# Patient Record
Sex: MALE | Race: WHITE | NOT HISPANIC OR LATINO | Employment: FULL TIME | URBAN - METROPOLITAN AREA
[De-identification: names, ages, dates, MRNs, and addresses within clinical notes are randomized per-mention and may not be internally consistent; named-entity substitution may affect disease eponyms.]

---

## 2020-02-01 ENCOUNTER — OFFICE VISIT (OUTPATIENT)
Dept: URGENT CARE | Facility: CLINIC | Age: 65
End: 2020-02-01
Payer: COMMERCIAL

## 2020-02-01 VITALS
SYSTOLIC BLOOD PRESSURE: 107 MMHG | RESPIRATION RATE: 16 BRPM | DIASTOLIC BLOOD PRESSURE: 51 MMHG | WEIGHT: 265 LBS | OXYGEN SATURATION: 98 % | HEIGHT: 68 IN | TEMPERATURE: 98.3 F | BODY MASS INDEX: 40.16 KG/M2 | HEART RATE: 74 BPM

## 2020-02-01 DIAGNOSIS — J06.9 VIRAL URI WITH COUGH: Primary | ICD-10-CM

## 2020-02-01 DIAGNOSIS — H69.93 DISORDER OF BOTH EUSTACHIAN TUBES: ICD-10-CM

## 2020-02-01 PROCEDURE — 99203 OFFICE O/P NEW LOW 30 MIN: CPT | Performed by: EMERGENCY MEDICINE

## 2020-02-01 RX ORDER — LISINOPRIL 10 MG/1
10 TABLET ORAL DAILY
COMMUNITY

## 2020-02-01 RX ORDER — PREDNISONE 10 MG/1
TABLET ORAL
Qty: 27 TABLET | Refills: 0 | Status: SHIPPED | OUTPATIENT
Start: 2020-02-01

## 2020-02-01 NOTE — PATIENT INSTRUCTIONS
You have been diagnosed with a Viral Upper Respiratory infection and your symptoms should resolve over the next 7 to 10 days with the treatments recommended today  If they do not, it is possible that you have developed a bacterial infection and you should return  If you were to take an antibiotic while you are still in the viral stage, you will not get better any faster, but could kill off the good germs in your body as well as make the germs in you resistant to the antibiotic  Take an expectorant - guaifenesin should be the only ingredient - during the day, and the cough suppressant (ex  Robitussin DM or Tessalon) if needed at night only  Take Zinc 12 5 to 15 mg every 2 - 3 hrs while awake for the next few days  You may take Cold Garry (13 3 mg of Zinc) or split a 25 mg Zinc tablet or lozenge in two or a 50 mg into four to get the proper dose  The total daily dose of Zinc should exceed 75 mg per day  You may also take a decongestant like Sudafed, unless you have hypertension or cardiac disease  Hold any NSAIDs like Ibuprofen (Advil), Naprosyn (Aleve), etc while on steroids like Medrol or Prednisone  If you are diabetic, you should also adhere strictly to your diet and monitor your blood sugar closely while on the steroids as discussed  Upper Respiratory Infection   AMBULATORY CARE:   An upper respiratory infection  is also called a common cold  It can affect your nose, throat, ears, and sinuses  Common signs and symptoms include the following:  Cold symptoms are usually worst for the first 3 to 5 days  You may have any of the following:  · Runny or stuffy nose    · Sneezing and coughing    · Sore throat or hoarseness    · Red, watery, and sore eyes    · Fatigue     · Chills and fever    · Headache, body aches, or sore muscles  Seek care immediately if:   · You have chest pain or trouble breathing  Contact your healthcare provider if:   · You have a fever over 102ºF (39°C)      · Your sore throat gets worse or you see white or yellow spots in your throat  · Your symptoms get worse after 3 to 5 days or your cold is not better in 14 days  · You have a rash anywhere on your skin  · You have large, tender lumps in your neck  · You have thick, green or yellow drainage from your nose  · You cough up thick yellow, green, or bloody mucus  · You have vomiting for more than 24 hours and cannot keep fluids down  · You have a bad earache  · You have questions or concerns about your condition or care  Treatment for a cold: There is no cure for the common cold  Colds are caused by viruses and do not get better with antibiotics  Most people get better in 7 to 14 days  You may continue to cough for 2 to 3 weeks  The following may help decrease your symptoms:  · Decongestants  help reduce nasal congestion and help you breathe more easily  If you take decongestant pills, they may make you feel restless or not able to sleep  Do not use decongestant sprays for more than a few days  · Cough suppressants  help reduce coughing  Ask your healthcare provider which type of cough medicine is best for you  · NSAIDs , such as ibuprofen, help decrease swelling, pain, and fever  NSAIDs can cause stomach bleeding or kidney problems in certain people  If you take blood thinner medicine, always ask your healthcare provider if NSAIDs are safe for you  Always read the medicine label and follow directions  · Acetaminophen  decreases pain and fever  It is available without a doctor's order  Ask how much to take and how often to take it  Follow directions  Read the labels of all other medicines you are using to see if they also contain acetaminophen, or ask your doctor or pharmacist  Acetaminophen can cause liver damage if not taken correctly  Do not use more than 4 grams (4,000 milligrams) total of acetaminophen in one day  Manage your cold:   · Rest as much as possible  Slowly start to do more each day  · Drink more liquids as directed  Liquids will help thin and loosen mucus so you can cough it up  Liquids will also help prevent dehydration  Liquids that help prevent dehydration include water, fruit juice, and broth  Do not drink liquids that contain caffeine  Caffeine can increase your risk for dehydration  Ask your healthcare provider how much liquid to drink each day  · Soothe a sore throat  Gargle with warm salt water  This helps your sore throat feel better  Make salt water by dissolving ¼ teaspoon salt in 1 cup warm water  You may also suck on hard candy or throat lozenges  You may use a sore throat spray  · Use a humidifier or vaporizer  Use a cool mist humidifier or a vaporizer to increase air moisture in your home  This may make it easier for you to breathe and help decrease your cough  · Use saline nasal drops as directed  These help relieve congestion  · Apply petroleum-based jelly around the outside of your nostrils  This can decrease irritation from blowing your nose  · Do not smoke  Nicotine and other chemicals in cigarettes and cigars can make your symptoms worse  They can also cause infections such as bronchitis or pneumonia  Ask your healthcare provider for information if you currently smoke and need help to quit  E-cigarettes or smokeless tobacco still contain nicotine  Talk to your healthcare provider before you use these products  Prevent spreading your cold to others:   · Try to stay away from other people during the first 2 to 3 days of your cold when it is more easily spread  · Do not share food or drinks  · Do not share hand towels with household members  · Wash your hands often, especially after you blow your nose  Turn away from other people and cover your mouth and nose with a tissue when you sneeze or cough  Follow up with your healthcare provider as directed:  Write down your questions so you remember to ask them during your visits     © 2017 [facility] Information is for End User's use only and may not be sold, redistributed or otherwise used for commercial purposes  All illustrations and images included in CareNotes® are the copyrighted property of [employer] M [employer]  or [employer]  The above information is an  only  It is not intended as medical advice for individual conditions or treatments  Talk to your doctor, nurse or pharmacist before following any medical regimen to see if it is safe and effective for you  Eustachian Tube Dysfunction   WHAT YOU NEED TO KNOW:   What is eustachian tube dysfunction? Eustachian tube dysfunction (ETD) is a condition that prevents your eustachian tubes from opening properly  It can also cause them to become blocked  Eustachian tubes connect your middle ear to the back of your nose and throat  These tubes open and allow air to flow in and out when you sneeze, swallow, or yawn  What causes or increases my risk of ETD? ETD may be caused by swelling or buildup of mucus in your eustachian tubes  Allergies, a cold, or sinus infection can increase your risk for ETD  Smoking also increases your risk for ETD  What are the signs and symptoms of ETD? · Fullness or pressure in your ears    · Muffled hearing    · Pain in one or both ears    · Ringing in your ears    · Popping or clicking feeling in your ears    · Trouble keeping your balance  How is ETD diagnosed? Your healthcare provider will ask about your symptoms  He will examine your ears, your nose, and the back of your throat  He may also do a hearing test    How is ETD treated? Your ETD may get better on its own without any treatment  You may need any of the following:  · Exercises  such as swallowing, yawning, or chewing gum may help to open your eustachian tubes  Your healthcare provider may also recommend that you take a deep breath and then blow with your mouth shut and your nostrils pinched closed       · Air pressure devices  push air into your nose and eustachian tubes to help relieve air pressure in your ear  · Treatment for allergies  such as decongestants, antihistamines, and nasal steroids may improve ETD  They may help decrease swelling of the eustachian tubes  · Ear tubes  may help to keep your middle ear open  During this procedure, your healthcare provider will cut a small hole in your eardrum  · A myringotomy  is procedure to make a small cut in your eardrum and suction out fluid from your middle ear  · Tuboplasty  is a procedure to widen your eustachian tubes  When should I contact my healthcare provider? · Your symptoms do not improve or get worse  · You have a fever  · You have any hearing loss  · You have questions or concerns about your condition or care  CARE AGREEMENT:   You have the right to help plan your care  Learn about your health condition and how it may be treated  Discuss treatment options with your caregivers to decide what care you want to receive  You always have the right to refuse treatment  The above information is an  only  It is not intended as medical advice for individual conditions or treatments  Talk to your doctor, nurse or pharmacist before following any medical regimen to see if it is safe and effective for you  © 2017 [employer] [employer]  Information is for End User's use only and may not be sold, redistributed or otherwise used for commercial purposes  All illustrations and images included in CareNotes® are the copyrighted property of A D A M [employer] Inc  or [employer] Ashby

## 2020-02-01 NOTE — PROGRESS NOTES
[facility]        NAME: Bebo Abdul is a 62 y o  male  : 1955    MRN: [id number]  DATE: 2020  TIME: 12:11 PM    Assessment and Plan   Viral URI with cough [J06 9, B97 89]  1  Viral URI with cough  predniSONE 10 mg tablet   2  Disorder of both eustachian tubes  predniSONE 10 mg tablet         Patient Instructions     Patient Instructions     You have been diagnosed with a Viral Upper Respiratory infection and your symptoms should resolve over the next 7 to 10 days with the treatments recommended today  If they do not, it is possible that you have developed a bacterial infection and you should return  If you were to take an antibiotic while you are still in the viral stage, you will not get better any faster, but could kill off the good germs in your body as well as make the germs in you resistant to the antibiotic  Take an expectorant - guaifenesin should be the only ingredient - during the day, and the cough suppressant (ex  Robitussin DM or Tessalon) if needed at night only  Take Zinc 12 5 to 15 mg every 2 - 3 hrs while awake for the next few days  You may take Cold Garry (13 3 mg of Zinc) or split a 25 mg Zinc tablet or lozenge in two or a 50 mg into four to get the proper dose  The total daily dose of Zinc should exceed 75 mg per day  You may also take a decongestant like Sudafed, unless you have hypertension or cardiac disease  Hold any NSAIDs like Ibuprofen (Advil), Naprosyn (Aleve), etc while on steroids like Medrol or Prednisone  If you are diabetic, you should also adhere strictly to your diet and monitor your blood sugar closely while on the steroids as discussed  Upper Respiratory Infection   AMBULATORY CARE:   An upper respiratory infection  is also called a common cold  It can affect your nose, throat, ears, and sinuses  Common signs and symptoms include the following:  Cold symptoms are usually worst for the first 3 to 5 days   You may have any of the following:  · Runny or stuffy nose    · Sneezing and coughing    · Sore throat or hoarseness    · Red, watery, and sore eyes    · Fatigue     · Chills and fever    · Headache, body aches, or sore muscles  Seek care immediately if:   · You have chest pain or trouble breathing  Contact your healthcare provider if:   · You have a fever over 102ºF (39°C)  · Your sore throat gets worse or you see white or yellow spots in your throat  · Your symptoms get worse after 3 to 5 days or your cold is not better in 14 days  · You have a rash anywhere on your skin  · You have large, tender lumps in your neck  · You have thick, green or yellow drainage from your nose  · You cough up thick yellow, green, or bloody mucus  · You have vomiting for more than 24 hours and cannot keep fluids down  · You have a bad earache  · You have questions or concerns about your condition or care  Treatment for a cold: There is no cure for the common cold  Colds are caused by viruses and do not get better with antibiotics  Most people get better in 7 to 14 days  You may continue to cough for 2 to 3 weeks  The following may help decrease your symptoms:  · Decongestants  help reduce nasal congestion and help you breathe more easily  If you take decongestant pills, they may make you feel restless or not able to sleep  Do not use decongestant sprays for more than a few days  · Cough suppressants  help reduce coughing  Ask your healthcare provider which type of cough medicine is best for you  · NSAIDs , such as ibuprofen, help decrease swelling, pain, and fever  NSAIDs can cause stomach bleeding or kidney problems in certain people  If you take blood thinner medicine, always ask your healthcare provider if NSAIDs are safe for you  Always read the medicine label and follow directions  · Acetaminophen  decreases pain and fever  It is available without a doctor's order  Ask how much to take and how often to take it  Follow directions  Read the labels of all other medicines you are using to see if they also contain acetaminophen, or ask your doctor or pharmacist  Acetaminophen can cause liver damage if not taken correctly  Do not use more than 4 grams (4,000 milligrams) total of acetaminophen in one day  Manage your cold:   · Rest as much as possible  Slowly start to do more each day  · Drink more liquids as directed  Liquids will help thin and loosen mucus so you can cough it up  Liquids will also help prevent dehydration  Liquids that help prevent dehydration include water, fruit juice, and broth  Do not drink liquids that contain caffeine  Caffeine can increase your risk for dehydration  Ask your healthcare provider how much liquid to drink each day  · Soothe a sore throat  Gargle with warm salt water  This helps your sore throat feel better  Make salt water by dissolving ¼ teaspoon salt in 1 cup warm water  You may also suck on hard candy or throat lozenges  You may use a sore throat spray  · Use a humidifier or vaporizer  Use a cool mist humidifier or a vaporizer to increase air moisture in your home  This may make it easier for you to breathe and help decrease your cough  · Use saline nasal drops as directed  These help relieve congestion  · Apply petroleum-based jelly around the outside of your nostrils  This can decrease irritation from blowing your nose  · Do not smoke  Nicotine and other chemicals in cigarettes and cigars can make your symptoms worse  They can also cause infections such as bronchitis or pneumonia  Ask your healthcare provider for information if you currently smoke and need help to quit  E-cigarettes or smokeless tobacco still contain nicotine  Talk to your healthcare provider before you use these products  Prevent spreading your cold to others:   · Try to stay away from other people during the first 2 to 3 days of your cold when it is more easily spread       · Do not share food or drinks  · Do not share hand towels with household members  · Wash your hands often, especially after you blow your nose  Turn away from other people and cover your mouth and nose with a tissue when you sneeze or cough  Follow up with your healthcare provider as directed:  Write down your questions so you remember to ask them during your visits  © 2017 [address] [employer] Information is for End User's use only and may not be sold, redistributed or otherwise used for commercial purposes  All illustrations and images included in CareNotes® are the copyrighted property of [employer]  or [employer]  The above information is an  only  It is not intended as medical advice for individual conditions or treatments  Talk to your doctor, nurse or pharmacist before following any medical regimen to see if it is safe and effective for you  Eustachian Tube Dysfunction   WHAT YOU NEED TO KNOW:   What is eustachian tube dysfunction? Eustachian tube dysfunction (ETD) is a condition that prevents your eustachian tubes from opening properly  It can also cause them to become blocked  Eustachian tubes connect your middle ear to the back of your nose and throat  These tubes open and allow air to flow in and out when you sneeze, swallow, or yawn  What causes or increases my risk of ETD? ETD may be caused by swelling or buildup of mucus in your eustachian tubes  Allergies, a cold, or sinus infection can increase your risk for ETD  Smoking also increases your risk for ETD  What are the signs and symptoms of ETD? · Fullness or pressure in your ears    · Muffled hearing    · Pain in one or both ears    · Ringing in your ears    · Popping or clicking feeling in your ears    · Trouble keeping your balance  How is ETD diagnosed? Your healthcare provider will ask about your symptoms  He will examine your ears, your nose, and the back of your throat   He may also do a hearing test    How is ETD treated? Your ETD may get better on its own without any treatment  You may need any of the following:  · Exercises  such as swallowing, yawning, or chewing gum may help to open your eustachian tubes  Your healthcare provider may also recommend that you take a deep breath and then blow with your mouth shut and your nostrils pinched closed  · Air pressure devices  push air into your nose and eustachian tubes to help relieve air pressure in your ear  · Treatment for allergies  such as decongestants, antihistamines, and nasal steroids may improve ETD  They may help decrease swelling of the eustachian tubes  · Ear tubes  may help to keep your middle ear open  During this procedure, your healthcare provider will cut a small hole in your eardrum  · A myringotomy  is procedure to make a small cut in your eardrum and suction out fluid from your middle ear  · Tuboplasty  is a procedure to widen your eustachian tubes  When should I contact my healthcare provider? · Your symptoms do not improve or get worse  · You have a fever  · You have any hearing loss  · You have questions or concerns about your condition or care  CARE AGREEMENT:   You have the right to help plan your care  Learn about your health condition and how it may be treated  Discuss treatment options with your caregivers to decide what care you want to receive  You always have the right to refuse treatment  The above information is an  only  It is not intended as medical advice for individual conditions or treatments  Talk to your doctor, nurse or pharmacist before following any medical regimen to see if it is safe and effective for you  © 2017 [facility] Information is for End User's use only and may not be sold, redistributed or otherwise used for commercial purposes   All illustrations and images included in CareNotes® are the copyrighted property of [employer]  or [employer] Ashby  Follow up with PCP in 3-5 days  Proceed to  ER if symptoms worsen  Chief Complaint     Chief Complaint   Patient presents with    Cold Like Symptoms     c/o head and chest congestion approx 4 days, felt chills and sweating yesterday and generalized tiredness         History of Present Illness       Patient complains of cough and congestion for past 4 days associated with bilateral ear fullness and fatigue  Review of Systems   Review of Systems   Constitutional: Negative for chills and fever  HENT: Positive for congestion, ear pain, rhinorrhea and sinus pressure  Negative for ear discharge and hearing loss  Respiratory: Positive for cough  Negative for chest tightness, shortness of breath and wheezing  Gastrointestinal: Negative for diarrhea and vomiting  Musculoskeletal: Negative for neck stiffness  Skin: Negative for pallor  Neurological: Negative for headaches  Current Medications       Current Outpatient Medications:     lisinopril (ZESTRIL) 10 mg tablet, Take 10 mg by mouth daily, Disp: , Rfl:     predniSONE 10 mg tablet, Take once daily all days pills on this schedule 6- 6- 5- 4- 3- 2- 1, Disp: 27 tablet, Rfl: 0    Current Allergies     Allergies as of 02/01/2020 - Reviewed 02/01/2020   Allergen Reaction Noted    Penicillins Hives 02/01/2020            The following portions of the patient's history were reviewed and updated as appropriate: allergies, current medications, past family history, past medical history, past social history, past surgical history and problem list      Past Medical History:   Diagnosis Date    Hypertension        Past Surgical History:   Procedure Laterality Date    NO PAST SURGERIES         History reviewed  No pertinent family history  Medications have been verified          Objective   /51   Pulse 74   Temp 98 3 °F (36 8 °C) (Tympanic)   Resp 16   Ht 5' 8" (1 727 m)   Wt 120 kg (265 lb)   SpO2 98% BMI 40 29 kg/m²        Physical Exam     Physical Exam   Constitutional: He is oriented to person, place, and time  He appears well-developed and well-nourished  No distress  HENT:   Head: Normocephalic and atraumatic  Right Ear: Tympanic membrane, external ear and ear canal normal    Left Ear: Tympanic membrane, external ear and ear canal normal    Nose: Mucosal edema and rhinorrhea present  Mouth/Throat: Posterior oropharyngeal erythema present  Nasal mucosa congested, throat mildly erythematous   Eyes: Pupils are equal, round, and reactive to light  Conjunctivae are normal    Neck: Neck supple  Cardiovascular: Normal rate, regular rhythm and normal heart sounds  Pulmonary/Chest: Effort normal and breath sounds normal  No respiratory distress  Lymphadenopathy:     He has no cervical adenopathy  Neurological: He is alert and oriented to person, place, and time  Skin: Skin is warm and dry  He is not diaphoretic  No pallor  Psychiatric: He has a normal mood and affect  His behavior is normal  Judgment and thought content normal    Nursing note and vitals reviewed

## 2023-06-25 ENCOUNTER — OFFICE VISIT (OUTPATIENT)
Dept: URGENT CARE | Facility: CLINIC | Age: 68
End: 2023-06-25
Payer: COMMERCIAL

## 2023-06-25 VITALS
WEIGHT: 280 LBS | HEART RATE: 75 BPM | BODY MASS INDEX: 41.47 KG/M2 | RESPIRATION RATE: 18 BRPM | HEIGHT: 69 IN | OXYGEN SATURATION: 97 % | TEMPERATURE: 98.5 F

## 2023-06-25 DIAGNOSIS — K08.89 PAIN, DENTAL: Primary | ICD-10-CM

## 2023-06-25 PROCEDURE — 99213 OFFICE O/P EST LOW 20 MIN: CPT | Performed by: PHYSICIAN ASSISTANT

## 2023-06-25 RX ORDER — KETOROLAC TROMETHAMINE 10 MG/1
10 TABLET, FILM COATED ORAL EVERY 6 HOURS PRN
Qty: 20 TABLET | Refills: 0 | Status: SHIPPED | OUTPATIENT
Start: 2023-06-25

## 2023-06-25 RX ORDER — CLINDAMYCIN HYDROCHLORIDE 300 MG/1
300 CAPSULE ORAL 3 TIMES DAILY
Qty: 21 CAPSULE | Refills: 0 | Status: SHIPPED | OUTPATIENT
Start: 2023-06-25 | End: 2023-07-02

## 2023-06-25 NOTE — PROGRESS NOTES
[facility]        NAME: Everett Salter is a 80 y o  male  : 1955    MRN: [id number]  DATE: 2023  TIME: 1:46 PM    Assessment and Plan   Pain, dental [K08 89]  1  Pain, dental  clindamycin (CLEOCIN) 300 MG capsule    ketorolac (TORADOL) 10 mg tablet            Patient Instructions   Toradol for severe pain  Do not take ibuprofen while taking Toradol  Tylenol  Salt water gargles  Make appoint with a dentist     Follow up with PCP in 3-5 days  Proceed to  ER if symptoms worsen  Chief Complaint     Chief Complaint   Patient presents with   • Dental Pain     Pt states facial swelling and tooth pain on the L side  Pt states he has to make an appt with the oral surgeon  Pt has been taking Ibuprofen  Pt states the pain started yesterday and he is now experiencing headaches  History of Present Illness       Patient is a 56-year-old male significant past medical history of hypertension presents the office complaining of left lower posterior dental pain for few days  States he had appointment with a dentist a week ago and was noted to have multiple teeth that need to be fixed including a broken tooth  Reports his tooth is not bothering him with associated facial swelling  Pain is rated 9 out of 10 with little relief from ibuprofen  Denies fevers or chills  Review of Systems   Review of Systems   Constitutional: Negative for fever  HENT: Positive for dental problem and facial swelling            Current Medications       Current Outpatient Medications:   •  clindamycin (CLEOCIN) 300 MG capsule, Take 1 capsule (300 mg total) by mouth 3 (three) times a day for 7 days, Disp: 21 capsule, Rfl: 0  •  ketorolac (TORADOL) 10 mg tablet, Take 1 tablet (10 mg total) by mouth every 6 (six) hours as needed for moderate pain, Disp: 20 tablet, Rfl: 0  •  lisinopril (ZESTRIL) 10 mg tablet, Take 10 mg by mouth daily, Disp: , Rfl:     Current Allergies     Allergies as of 2023 - Reviewed "06/25/2023   Allergen Reaction Noted   • Penicillins Hives 02/01/2020            The following portions of the patient's history were reviewed and updated as appropriate: allergies, current medications, past family history, past medical history, past social history, past surgical history and problem list      Past Medical History:   Diagnosis Date   • Hypertension        Past Surgical History:   Procedure Laterality Date   • NO PAST SURGERIES         History reviewed  No pertinent family history  Medications have been verified  Objective   Pulse 75   Temp 98 5 °F (36 9 °C)   Resp 18   Ht 5' 9\" (1 753 m)   Wt 127 kg (280 lb)   SpO2 97%   BMI 41 35 kg/m²   No LMP for male patient  Physical Exam     Physical Exam  Vitals and nursing note reviewed  Constitutional:       Appearance: Normal appearance  He is well-developed  HENT:      Head: Normocephalic and atraumatic  Right Ear: Tympanic membrane, ear canal and external ear normal       Left Ear: Tympanic membrane, ear canal and external ear normal       Nose: Nose normal       Mouth/Throat:      Dentition: Abnormal dentition  Dental tenderness and dental caries present  No gingival swelling or dental abscesses  Pharynx: Uvula midline  Eyes:      General: Lids are normal       Conjunctiva/sclera: Conjunctivae normal       Pupils: Pupils are equal, round, and reactive to light  Cardiovascular:      Rate and Rhythm: Normal rate and regular rhythm  Heart sounds: Normal heart sounds  No murmur heard  No friction rub  No gallop  Pulmonary:      Effort: Pulmonary effort is normal       Breath sounds: Normal breath sounds  No stridor  No wheezing or rales  Musculoskeletal:         General: Normal range of motion  Cervical back: Neck supple  Skin:     General: Skin is warm and dry  Capillary Refill: Capillary refill takes less than 2 seconds  Neurological:      Mental Status: He is alert                     "

## 2024-09-28 ENCOUNTER — OFFICE VISIT (OUTPATIENT)
Dept: URGENT CARE | Facility: CLINIC | Age: 69
End: 2024-09-28
Payer: COMMERCIAL

## 2024-09-28 VITALS
HEART RATE: 84 BPM | OXYGEN SATURATION: 99 % | HEIGHT: 68 IN | BODY MASS INDEX: 41.89 KG/M2 | DIASTOLIC BLOOD PRESSURE: 83 MMHG | WEIGHT: 276.4 LBS | TEMPERATURE: 97.6 F | RESPIRATION RATE: 20 BRPM | SYSTOLIC BLOOD PRESSURE: 172 MMHG

## 2024-09-28 DIAGNOSIS — J06.9 VIRAL UPPER RESPIRATORY TRACT INFECTION WITH COUGH: Primary | ICD-10-CM

## 2024-09-28 PROBLEM — I42.9 CARDIOMYOPATHY (HCC): Status: ACTIVE | Noted: 2018-04-03

## 2024-09-28 PROBLEM — E11.9 TYPE 2 DIABETES MELLITUS WITHOUT COMPLICATION, WITHOUT LONG-TERM CURRENT USE OF INSULIN (HCC): Chronic | Status: ACTIVE | Noted: 2019-04-16

## 2024-09-28 PROBLEM — E66.01 MORBID OBESITY WITH BMI OF 40.0-44.9, ADULT (HCC): Status: ACTIVE | Noted: 2020-06-25

## 2024-09-28 PROCEDURE — S9083 URGENT CARE CENTER GLOBAL: HCPCS

## 2024-09-28 PROCEDURE — 87636 SARSCOV2 & INF A&B AMP PRB: CPT

## 2024-09-28 PROCEDURE — G0381 LEV 2 HOSP TYPE B ED VISIT: HCPCS

## 2024-09-28 NOTE — PATIENT INSTRUCTIONS
This will get better on its own.   Encourage extra fluids and follow regular diet as tolerated.  You may give acetaminophen every 4 hours, or ibuprofen every 6 hours as needed for fever or symptom relief.   No cold or cough medicines are recommended.  Try nasal saline spray as needed to help congestion  Honey or warm liquids (tea or lemonade) are often helpful for cough.  Call if symptoms not improving after 7-10 days OR if he develops worsening cough, has any difficulty breathing, persistent fever (more than 4-5 days total), signs of dehydration (decreased urination, dry, cracked lips), or if you are concerned.

## 2024-09-28 NOTE — PROGRESS NOTES
[facility]        NAME: Deepak Dinero is a 69 y.o. male  : 1955    MRN: [id number]  DATE: 2024  TIME: 11:33 AM    Assessment and Plan   Viral upper respiratory tract infection with cough [J06.9]  1. Viral upper respiratory tract infection with cough  Covid/Flu- Office Collect Normal            Patient Instructions   This will get better on its own.   Encourage extra fluids and follow regular diet as tolerated.  You may give acetaminophen every 4 hours, or ibuprofen every 6 hours as needed for fever or symptom relief.   No cold or cough medicines are recommended.  Try nasal saline spray as needed to help congestion  Honey or warm liquids (tea or lemonade) are often helpful for cough.  Call if symptoms not improving after 7-10 days OR if he develops worsening cough, has any difficulty breathing, persistent fever (more than 4-5 days total), signs of dehydration (decreased urination, dry, cracked lips), or if you are concerned.  COVID/Flu will return in 48 hours and will reach out with positive results    Follow up with PCP in 3-5 days.  Proceed to  ER if symptoms worsen.    If tests have been performed at [facility], our office will contact you with results if changes need to be made to the care plan discussed with you at the visit.  You can review your full results on [facility] MyChart.    Chief Complaint     Chief Complaint   Patient presents with    Cold Like Symptoms     Patient reports some body aches with some nasal congestion. Patient also reports some chills but denies any fevers. Patient reports using some Allegra D with minimal relief.          History of Present Illness       Patient reports some body aches with some nasal congestion. Patient also reports some chills but denies any fevers. Patient reports using some Allegra D with minimal relief.   He states that for the past four days he is having symptoms of congestion, body aches, and chills.  He says that he is sweating a  lot at nighttime and that he has not had any fevers. He states that his wife is sick and has a cold  He denies COVID testing himself and he denies being immunocomprised  He admits to sinus pressure underneath his eyes and he states that he had a mild headache yesterday.  He states that he took motrin this morning which helped a little bit and pressure from yesterday.  He admits to a minimal cough. He denies smoking, COPD, or asthma.             Review of Systems   Review of Systems   Constitutional:  Negative for chills and fever.   HENT:  Positive for congestion and postnasal drip. Negative for ear pain and sore throat.    Eyes:  Negative for pain and visual disturbance.   Respiratory:  Positive for cough. Negative for shortness of breath.    Cardiovascular:  Negative for chest pain and palpitations.   Gastrointestinal:  Negative for abdominal pain and vomiting.   Genitourinary:  Negative for dysuria and hematuria.   Musculoskeletal:  Negative for arthralgias and back pain.   Skin:  Negative for color change and rash.   Neurological:  Negative for seizures and syncope.   All other systems reviewed and are negative.        Current Medications       Current Outpatient Medications:     ketorolac (TORADOL) 10 mg tablet, Take 1 tablet (10 mg total) by mouth every 6 (six) hours as needed for moderate pain, Disp: 20 tablet, Rfl: 0    lisinopril (ZESTRIL) 10 mg tablet, Take 10 mg by mouth daily, Disp: , Rfl:     Current Allergies     Allergies as of 09/28/2024 - Reviewed 09/28/2024   Allergen Reaction Noted    Penicillins Hives 02/01/2020            The following portions of the patient's history were reviewed and updated as appropriate: allergies, current medications, past family history, past medical history, past social history, past surgical history and problem list.     Past Medical History:   Diagnosis Date    Hypertension        Past Surgical History:   Procedure Laterality Date    NO PAST SURGERIES         History  "reviewed. No pertinent family history.      Medications have been verified.        Objective   BP (!) 172/83 (BP Location: Right arm, Patient Position: Sitting, Cuff Size: Standard)   Pulse 84   Temp 97.6 °F (36.4 °C) (Temporal)   Resp 20   Ht 5' 8\" (1.727 m)   Wt 125 kg (276 lb 6.4 oz)   SpO2 99%   BMI 42.03 kg/m²   No LMP for male patient.       Physical Exam     Physical Exam  Constitutional:       General: He is not in acute distress.     Appearance: Normal appearance. He is not ill-appearing, toxic-appearing or diaphoretic.   HENT:      Head: Normocephalic and atraumatic.      Right Ear: Tympanic membrane, ear canal and external ear normal.      Left Ear: Tympanic membrane, ear canal and external ear normal.      Nose: Congestion and rhinorrhea present.      Mouth/Throat:      Mouth: Mucous membranes are moist.      Pharynx: Oropharynx is clear. Posterior oropharyngeal erythema present. No oropharyngeal exudate.   Eyes:      General: No scleral icterus.        Right eye: No discharge.         Left eye: No discharge.      Conjunctiva/sclera: Conjunctivae normal.      Pupils: Pupils are equal, round, and reactive to light.   Cardiovascular:      Rate and Rhythm: Normal rate and regular rhythm.      Pulses: Normal pulses.      Heart sounds: Normal heart sounds. No murmur heard.     No friction rub. No gallop.   Pulmonary:      Effort: Pulmonary effort is normal. No respiratory distress.      Breath sounds: Normal breath sounds. No stridor. No wheezing, rhonchi or rales.   Chest:      Chest wall: No tenderness.   Abdominal:      General: Abdomen is flat. Bowel sounds are normal. There is no distension.      Palpations: Abdomen is soft.      Tenderness: There is no abdominal tenderness. There is no guarding.   Musculoskeletal:         General: No swelling, tenderness or deformity. Normal range of motion.      Cervical back: Normal range of motion and neck supple.   Skin:     General: Skin is warm and dry.     "  Coloration: Skin is not jaundiced or pale.   Neurological:      General: No focal deficit present.      Mental Status: He is alert and oriented to person, place, and time. Mental status is at baseline.      Cranial Nerves: No cranial nerve deficit.      Sensory: No sensory deficit.      Motor: No weakness.   Psychiatric:         Mood and Affect: Mood normal.         Behavior: Behavior normal.         Thought Content: Thought content normal.         Judgment: Judgment normal.

## 2024-09-30 LAB
FLUAV RNA RESP QL NAA+PROBE: NEGATIVE
FLUBV RNA RESP QL NAA+PROBE: NEGATIVE
SARS-COV-2 RNA RESP QL NAA+PROBE: POSITIVE